# Patient Record
Sex: MALE | Race: WHITE
[De-identification: names, ages, dates, MRNs, and addresses within clinical notes are randomized per-mention and may not be internally consistent; named-entity substitution may affect disease eponyms.]

---

## 2021-08-14 ENCOUNTER — HOSPITAL ENCOUNTER (EMERGENCY)
Dept: HOSPITAL 26 - MED | Age: 31
Discharge: HOME | End: 2021-08-14
Payer: MEDICAID

## 2021-08-14 VITALS — BODY MASS INDEX: 41.52 KG/M2 | HEIGHT: 70 IN | WEIGHT: 290 LBS

## 2021-08-14 VITALS — SYSTOLIC BLOOD PRESSURE: 147 MMHG | DIASTOLIC BLOOD PRESSURE: 85 MMHG

## 2021-08-14 VITALS — DIASTOLIC BLOOD PRESSURE: 86 MMHG | SYSTOLIC BLOOD PRESSURE: 130 MMHG

## 2021-08-14 DIAGNOSIS — R10.32: Primary | ICD-10-CM

## 2021-08-14 LAB
ALBUMIN FLD-MCNC: 4.2 G/DL (ref 3.4–5)
ANION GAP SERPL CALCULATED.3IONS-SCNC: 14.1 MMOL/L (ref 8–16)
APPEARANCE UR: CLEAR
AST SERPL-CCNC: 36 U/L (ref 15–37)
BASOPHILS # BLD AUTO: 0.1 K/UL (ref 0–0.22)
BASOPHILS NFR BLD AUTO: 0.4 % (ref 0–2)
BILIRUB SERPL-MCNC: 1.9 MG/DL (ref 0–1)
BILIRUB UR QL STRIP: NEGATIVE
BUN SERPL-MCNC: 13 MG/DL (ref 7–18)
CHLORIDE SERPL-SCNC: 100 MMOL/L (ref 98–107)
CO2 SERPL-SCNC: 28.5 MMOL/L (ref 21–32)
COLOR UR: YELLOW
CREAT SERPL-MCNC: 1.1 MG/DL (ref 0.6–1.3)
EOSINOPHIL # BLD AUTO: 0.2 K/UL (ref 0–0.4)
EOSINOPHIL NFR BLD AUTO: 1.3 % (ref 0–4)
ERYTHROCYTE [DISTWIDTH] IN BLOOD BY AUTOMATED COUNT: 13 % (ref 11.6–13.7)
GFR SERPL CREATININE-BSD FRML MDRD: 100 ML/MIN (ref 90–?)
GLUCOSE SERPL-MCNC: 129 MG/DL (ref 74–106)
GLUCOSE UR STRIP-MCNC: NEGATIVE MG/DL
HCT VFR BLD AUTO: 45.5 % (ref 36–52)
HGB BLD-MCNC: 15.3 G/DL (ref 12–18)
HGB UR QL STRIP: NEGATIVE
LEUKOCYTE ESTERASE UR QL STRIP: NEGATIVE
LIPASE SERPL-CCNC: 126 U/L (ref 73–393)
LYMPHOCYTES # BLD AUTO: 1.4 K/UL (ref 2–11.5)
LYMPHOCYTES NFR BLD AUTO: 9.1 % (ref 20.5–51.1)
MCH RBC QN AUTO: 28 PG (ref 27–31)
MCHC RBC AUTO-ENTMCNC: 34 G/DL (ref 33–37)
MCV RBC AUTO: 83.7 FL (ref 80–94)
MONOCYTES # BLD AUTO: 0.6 K/UL (ref 0.8–1)
MONOCYTES NFR BLD AUTO: 3.5 % (ref 1.7–9.3)
NEUTROPHILS # BLD AUTO: 13.3 K/UL (ref 1.8–7.7)
NEUTROPHILS NFR BLD AUTO: 85.7 % (ref 42.2–75.2)
NITRITE UR QL STRIP: NEGATIVE
PH UR STRIP: 6 [PH] (ref 5–9)
PLATELET # BLD AUTO: 238 K/UL (ref 140–450)
POTASSIUM SERPL-SCNC: 3.6 MMOL/L (ref 3.5–5.1)
RBC # BLD AUTO: 5.43 MIL/UL (ref 4.2–6.1)
SODIUM SERPL-SCNC: 139 MMOL/L (ref 136–145)
WBC # BLD AUTO: 15.6 K/UL (ref 4.8–10.8)

## 2021-08-14 NOTE — NUR
30 YO/M BIB SELF W C/O RUQ ABDMINAL PAIN X1 WEEK THAT WORSENES THE "LAST FEW 
DAYS" TO 5/10 S/P DIGESTION OF DELI FOODS. PATIENT DESCRIBES PAIN AS PRESSURE 
LIKE, AND REPORTS PAIN COMES AND GOES ACCOMPANIED W NAUSEA, NO VOMITING. DENIES 
PAIN OR NAUSEA AT THIS TIME. PATIENT REPORTS FEELING WARM YESTERDAY, NO 
CONFIRMED FEVER. PATIENT REPORTS EPISODES OF DIARRHEA AND CONSTIPATION THE LAST 
FEW DAYS, NONE TODAY. DENIES ANY INJURIES. BOWEL SOUNDS PRESENT THROUGHOUT, 
ABDOMEN NON-TENDER. PATIENT SITTING IN ROOM IN CHAIR, BREATHING EVEN AND 
UNLABORED. NAD NOTED, WILL CONTINUE TO MONITOR. 



PMH:GALLBLADDER REMOVAL X9 YEARS AGO

DESTINI

## 2021-08-19 ENCOUNTER — HOSPITAL ENCOUNTER (EMERGENCY)
Dept: HOSPITAL 26 - MED | Age: 31
Discharge: HOME | End: 2021-08-19
Payer: MEDICAID

## 2021-08-19 VITALS — SYSTOLIC BLOOD PRESSURE: 161 MMHG | DIASTOLIC BLOOD PRESSURE: 90 MMHG

## 2021-08-19 VITALS — DIASTOLIC BLOOD PRESSURE: 99 MMHG | SYSTOLIC BLOOD PRESSURE: 158 MMHG

## 2021-08-19 DIAGNOSIS — F41.9: ICD-10-CM

## 2021-08-19 DIAGNOSIS — Z79.899: ICD-10-CM

## 2021-08-19 DIAGNOSIS — R00.2: ICD-10-CM

## 2021-08-19 DIAGNOSIS — R06.02: Primary | ICD-10-CM

## 2021-08-19 NOTE — NUR
32 y/o M BIB self from home with c/c chest pressure and abdominal pain. Patient 
A&Ox4, ambulatory, reports LUQ pain x 2-3 weeks, states 0/10 at this time but 
7/10 when it occurs, "ball"/intermittent, radiating to RUQ. Patient also states 
sternal chest pressure 7/10, pressure/intermittent, non-radiating pain. Patient 
noted that drinking milk worsens pain. Patient reports nausea and intermittent 
chills. Denies SOB, headache, dizziness, vomiting, constipation, recent 
fall/trauma/injury. Denies any meds prior to arrival; states Pepto Bismol last 
week with relief to symptoms. Bed locked in lowest position, side rails x 1.



PMH/Meds: Denies

NKA

Sx: Cholecysectomy 2009